# Patient Record
Sex: FEMALE | Race: WHITE | Employment: UNEMPLOYED | URBAN - METROPOLITAN AREA
[De-identification: names, ages, dates, MRNs, and addresses within clinical notes are randomized per-mention and may not be internally consistent; named-entity substitution may affect disease eponyms.]

---

## 2017-04-03 NOTE — TELEPHONE ENCOUNTER
Was the patient seen in the last year in this department? Yes  Pt last seen 05/25/2016    Does patient have an active prescription for medications requested? No     Received Request Via: Pharmacy

## 2017-04-04 RX ORDER — LEVONORGESTREL AND ETHINYL ESTRADIOL 0.1-0.02MG
KIT ORAL
Qty: 28 TAB | Refills: 2 | Status: SHIPPED | OUTPATIENT
Start: 2017-04-04

## 2017-04-07 NOTE — TELEPHONE ENCOUNTER
Spoke withient and she will keep her appt. On 04/12/2017 for an annual and to discuss BC. Will schedule her at that time for her pap in November

## 2017-04-12 ENCOUNTER — OFFICE VISIT (OUTPATIENT)
Dept: MEDICAL GROUP | Facility: MEDICAL CENTER | Age: 27
End: 2017-04-12
Payer: COMMERCIAL

## 2017-04-12 VITALS
HEIGHT: 63 IN | TEMPERATURE: 98.8 F | WEIGHT: 96.2 LBS | BODY MASS INDEX: 17.05 KG/M2 | OXYGEN SATURATION: 100 % | DIASTOLIC BLOOD PRESSURE: 62 MMHG | SYSTOLIC BLOOD PRESSURE: 92 MMHG | HEART RATE: 55 BPM

## 2017-04-12 DIAGNOSIS — Z13.21 ENCOUNTER FOR VITAMIN DEFICIENCY SCREENING: ICD-10-CM

## 2017-04-12 DIAGNOSIS — Z13.228 SCREENING FOR METABOLIC DISORDER: ICD-10-CM

## 2017-04-12 DIAGNOSIS — Z13.0 SCREENING FOR DEFICIENCY ANEMIA: ICD-10-CM

## 2017-04-12 DIAGNOSIS — Z13.220 SCREENING FOR HYPERLIPIDEMIA: ICD-10-CM

## 2017-04-12 DIAGNOSIS — Z30.09 FAMILY PLANNING: ICD-10-CM

## 2017-04-12 PROCEDURE — 99395 PREV VISIT EST AGE 18-39: CPT | Performed by: NURSE PRACTITIONER

## 2017-04-12 ASSESSMENT — PATIENT HEALTH QUESTIONNAIRE - PHQ9: CLINICAL INTERPRETATION OF PHQ2 SCORE: 0

## 2017-04-12 NOTE — PROGRESS NOTES
Chief Complaint:     Jahaira MONTGOMERY is a 26 y.o. female who presents for annual exam and   Family planning  Planning to get pregnant in the fall  Taking ocps now      Pt has GYN provider: no  Last colonoscopy: n/a  Bone density test:N\A  Gardiasil:declined   Last Td: 8/2014  Regular exercise: yes     She  has a past medical history of Raynaud's syndrome.  She  has no past surgical history on file.  Current Outpatient Prescriptions   Medication Sig Dispense Refill   • SRONYX 0.1-20 MG-MCG per tablet TAKE 1 TAB BY MOUTH EVERY DAY. 28 Tab 2   • fluticasone (FLONASE) 50 MCG/ACT nasal spray Spray 2 sprays in each nostril daily PRN (Patient not taking: Reported on 5/25/2016) 1 Bottle 0     No current facility-administered medications for this visit.     Social History   Substance Use Topics   • Smoking status: Never Smoker    • Smokeless tobacco: Never Used   • Alcohol Use: 0.0 oz/week     0 Standard drinks or equivalent per week       Review Of Systems  Denies fever, chills, or sweats  Skin: negative for rash, scaling, itching, pigmentation, hair or nail changes.  Eyes: negative for visual blurring, double vision, eye pain, floaters and discharge from eyes  Ears/Nose/Throat: negative for tinnitus, vertigo, bleeding gums, dental problem and hoarseness, frequent URI's, sinus trouble, persistent sore throat  Respiratory: negative for persistent cough, hemoptysis, dyspnea, recurrent pneumonia or bronchitis, asthma and wheezing  Cardiovascular: negative for palpitations, tachycardia, irregular heart beat, chest pain, or peripheral edema.  Breast: Denies breast tenderness, mass, discharge, changes in size or contour, or abnormal cyclic discomfort.  Gastrointestinal: negative for poor appetite, dysphagia, nausea, heartburn or reflux, abdominal pain, hemorrhoids, constipation or diarrhea  Genitourinary: negative for nocturia, dysuria, frequency, hesitancy, incontinence, sexually transmitted disease, abnormal vaginal discharge,  "dysparunia or abnormal vaginal bleeding  Musculoskeletal: negative for joint swelling and muscle pain/ soreness  Neurologic: negative for migraine headaches, involuntary movements or tremor  Psychiatric: negative for excessive alcohol consumption or illegal drug usage, sleep disturbance, anxiety, depression, sexual difficulties  Hematologic/Lymphatic/Immunologic: negative for anemia, unusual bruising, swollen glands, HIV risk factors  Endocrine: negative for temperature intolerance, polydipsia, polyuria.       PHYSICAL EXAMINATION:  Blood pressure 92/62, pulse 55, temperature 37.1 °C (98.8 °F), height 1.6 m (5' 2.99\"), weight 43.636 kg (96 lb 3.2 oz), last menstrual period 04/04/2017, SpO2 100 %, not currently breastfeeding.  Body mass index is 17.05 kg/(m^2).  Wt Readings from Last 4 Encounters:   04/12/17 43.636 kg (96 lb 3.2 oz)   05/25/16 44.271 kg (97 lb 9.6 oz)   03/30/16 44.18 kg (97 lb 6.4 oz)   07/16/15 45.36 kg (100 lb)       Constitutional: Alert, no distress.  Skin: Warm, dry, good turgor, no rashes in visible areas.  Eye: Equal, round and reactive, conjunctiva clear, lids normal.  ENMT: Lips without lesions, good dentition, oropharynx clear.  Neck: Trachea midline, no masses, no thyromegaly.  Respiratory: Unlabored respiratory effort, lungs clear to auscultation, no wheezes, no ronchi.  Cardiovascular: Normal S1, S2, no murmur, no edema.  Abdomen: Soft, non-tender, no masses, no hepatosplenomegaly.  Psych: Alert and oriented x3, normal affect and mood.    ASSESSMENT/PLAN:  1. Family planning     2. Screening for deficiency anemia  CBC WITHOUT DIFFERENTIAL   3. Screening for metabolic disorder  COMP METABOLIC PANEL    LIPID PROFILE    TSH   4. Screening for hyperlipidemia  LIPID PROFILE   5. Encounter for vitamin deficiency screening  VITAMIN D,25 HYDROXY        Labs per orders  Immunizations per orders  Pt counseled about skin care, diet, supplements, and exercise.  Next office visit for recheck of " chronic medical conditions is due in  1 year

## 2017-04-12 NOTE — MR AVS SNAPSHOT
"Jahaira MONTGOMERY   2017 10:00 AM   Office Visit   MRN: [id number]    Department:  [address] [place] [address]   Dept Phone:  [phone]    Description:  Female : 1990   Provider:  BELKYS Cade           Reason for Visit     Annual Exam     Contraception discuss family planning      Allergies as of 2017     Allergen Noted Reactions    Psudatabs 2015   Unspecified    Poor circulation in hands      You were diagnosed with     Screening for deficiency anemia   [022440]       Screening for metabolic disorder   [9459880]       Screening for hyperlipidemia   [679018]       Encounter for vitamin deficiency screening   [[id number]]         Vital Signs     Blood Pressure Pulse Temperature Height Weight Body Mass Index    92/62 mmHg 55 37.1 °C (98.8 °F) 1.6 m (5' 2.99\") 43.636 kg (96 lb 3.2 oz) 17.05 kg/m2    Oxygen Saturation Last Menstrual Period Breastfeeding? Smoking Status          100% 2017 No Never Smoker         Basic Information     Date Of Birth Sex Race Ethnicity Preferred Language    1990 Female White Unknown English      Problem List              ICD-10-CM Priority Class Noted - Resolved    Family planning Z30.09   2015 - Present      Health Maintenance        Date Due Completion Dates    IMM HEP A VACCINE (1 of 2 - Standard Series) 1990 ---    IMM VARICELLA (CHICKENPOX) VACCINE (1 of 2 - 2 Dose Adolescent Series) 2002 ---    IMM HPV VACCINE (2 of 3 - Female 3 Dose Series) 9/10/2015 2015 (Declined)    Override on 2015: Patient Declined    PAP SMEAR 11/3/2017 11/3/2014    IMM DTaP/Tdap/Td Vaccine (7 - Td) 2024, 10/4/1993, 1992, 1990, 1990, 1990            Current Immunizations     Dtap Vaccine 10/4/1993, 1992, 1990, 1989, 1989    Hepatitis B Vaccine Non-Recombivax (Ped/Adol) 2015, 2004, 2004    Hib Vaccine (Prp-d) Historical Data 1990    Influenza Vaccine Quad Inj (Pf) 10/5/2015 11:16 " AM    MMR Vaccine 10/4/1993, 11/9/1990    OPV - Historical Data 10/4/1993, 11/28/1992, 1989, 1989    Tdap Vaccine 8/6/2014    Tuberculin Skin Test 8/28/2013  9:30 AM, 8/21/2013  9:40 AM      Below and/or attached are the medications your provider expects you to take. Review all of your home medications and newly ordered medications with your provider and/or pharmacist. Follow medication instructions as directed by your provider and/or pharmacist. Please keep your medication list with you and share with your provider. Update the information when medications are discontinued, doses are changed, or new medications (including over-the-counter products) are added; and carry medication information at all times in the event of emergency situations     Allergies:  PSUDATABS - Unspecified               Medications  Valid as of: April 12, 2017 - 10:29 AM    Generic Name Brand Name Tablet Size Instructions for use    Fluticasone Propionate (Suspension) FLONASE 50 MCG/ACT Spray 2 sprays in each nostril daily PRN        Levonorgestrel-Ethinyl Estrad (Tab) SRONYX 0.1-20 MG-MCG TAKE 1 TAB BY MOUTH EVERY DAY.        .                 Medicines prescribed today were sent to:     [facility]/PH[facility]ACY #[id number] [facility] [address] [id number] [facility] [address]    Phone: [phone] Fax: [phone]    Open 24 Hours?: No      Medication refill instructions:       If your prescription bottle indicates you have medication refills left, it is not necessary to call your provider’s office. Please contact your pharmacy and they will refill your medication.    If your prescription bottle indicates you do not have any refills left, you may request refills at any time through one of the following ways: The online [employer] system (except Urgent Care), by calling your provider’s office, or by asking your pharmacy to contact your provider’s office with a refill request. Medication refills are processed only during regular business  hours and may not be available until the next business day. Your provider may request additional information or to have a follow-up visit with you prior to refilling your medication.   *Please Note: Medication refills are assigned a new Rx number when refilled electronically. Your pharmacy may indicate that no refills were authorized even though a new prescription for the same medication is available at the pharmacy. Please request the medicine by name with the pharmacy before contacting your provider for a refill.        Your To Do List     Future Labs/Procedures Complete By Expires    CBC WITHOUT DIFFERENTIAL  As directed 10/13/2017    COMP METABOLIC PANEL  As directed 4/13/2018    LIPID PROFILE  As directed 4/13/2018    TSH  As directed 4/13/2018    VITAMIN D,25 HYDROXY  As directed 4/13/2018         MyChart Access Code: Activation code not generated  Current [employer] Status: Active

## 2017-05-03 RX ORDER — LEVONORGESTREL AND ETHINYL ESTRADIOL 0.1-0.02MG
1 KIT ORAL
Qty: 28 TAB | Refills: 2 | OUTPATIENT
Start: 2017-05-03

## 2017-05-16 RX ORDER — LEVONORGESTREL AND ETHINYL ESTRADIOL 0.1-0.02MG
KIT ORAL
Qty: 28 TAB | Refills: 0 | Status: SHIPPED | OUTPATIENT
Start: 2017-05-16 | End: 2017-07-19 | Stop reason: SDUPTHER

## 2017-06-19 ENCOUNTER — HOSPITAL ENCOUNTER (OUTPATIENT)
Dept: LAB | Facility: MEDICAL CENTER | Age: 27
End: 2017-06-19
Attending: NURSE PRACTITIONER
Payer: COMMERCIAL

## 2017-06-19 DIAGNOSIS — Z13.228 SCREENING FOR METABOLIC DISORDER: ICD-10-CM

## 2017-06-19 DIAGNOSIS — Z13.220 SCREENING FOR HYPERLIPIDEMIA: ICD-10-CM

## 2017-06-19 DIAGNOSIS — Z13.21 ENCOUNTER FOR VITAMIN DEFICIENCY SCREENING: ICD-10-CM

## 2017-06-19 DIAGNOSIS — Z13.0 SCREENING FOR DEFICIENCY ANEMIA: ICD-10-CM

## 2017-06-19 LAB
25(OH)D3 SERPL-MCNC: 20 NG/ML (ref 30–100)
ALBUMIN SERPL BCP-MCNC: 4.3 G/DL (ref 3.2–4.9)
ALBUMIN/GLOB SERPL: 1.7 G/DL
ALP SERPL-CCNC: 55 U/L (ref 30–99)
ALT SERPL-CCNC: 9 U/L (ref 2–50)
ANION GAP SERPL CALC-SCNC: 5 MMOL/L (ref 0–11.9)
AST SERPL-CCNC: 15 U/L (ref 12–45)
BILIRUB SERPL-MCNC: 0.5 MG/DL (ref 0.1–1.5)
BUN SERPL-MCNC: 13 MG/DL (ref 8–22)
CALCIUM SERPL-MCNC: 9.3 MG/DL (ref 8.5–10.5)
CHLORIDE SERPL-SCNC: 110 MMOL/L (ref 96–112)
CHOLEST SERPL-MCNC: 185 MG/DL (ref 100–199)
CO2 SERPL-SCNC: 24 MMOL/L (ref 20–33)
CREAT SERPL-MCNC: 0.91 MG/DL (ref 0.5–1.4)
ERYTHROCYTE [DISTWIDTH] IN BLOOD BY AUTOMATED COUNT: 44.6 FL (ref 35.9–50)
GFR SERPL CREATININE-BSD FRML MDRD: >60 ML/MIN/1.73 M 2
GLOBULIN SER CALC-MCNC: 2.5 G/DL (ref 1.9–3.5)
GLUCOSE SERPL-MCNC: 74 MG/DL (ref 65–99)
HCT VFR BLD AUTO: 45.9 % (ref 37–47)
HDLC SERPL-MCNC: 53 MG/DL
HGB BLD-MCNC: 14.8 G/DL (ref 12–16)
LDLC SERPL CALC-MCNC: 115 MG/DL
MCH RBC QN AUTO: 30.3 PG (ref 27–33)
MCHC RBC AUTO-ENTMCNC: 32.2 G/DL (ref 33.6–35)
MCV RBC AUTO: 94.1 FL (ref 81.4–97.8)
PLATELET # BLD AUTO: 188 K/UL (ref 164–446)
PMV BLD AUTO: 10.3 FL (ref 9–12.9)
POTASSIUM SERPL-SCNC: 4.5 MMOL/L (ref 3.6–5.5)
PROT SERPL-MCNC: 6.8 G/DL (ref 6–8.2)
RBC # BLD AUTO: 4.88 M/UL (ref 4.2–5.4)
SODIUM SERPL-SCNC: 139 MMOL/L (ref 135–145)
TRIGL SERPL-MCNC: 84 MG/DL (ref 0–149)
TSH SERPL DL<=0.005 MIU/L-ACNC: 1.65 UIU/ML (ref 0.3–3.7)
WBC # BLD AUTO: 5.8 K/UL (ref 4.8–10.8)

## 2017-06-19 PROCEDURE — 85027 COMPLETE CBC AUTOMATED: CPT

## 2017-06-19 PROCEDURE — 36415 COLL VENOUS BLD VENIPUNCTURE: CPT

## 2017-06-19 PROCEDURE — 84443 ASSAY THYROID STIM HORMONE: CPT

## 2017-06-19 PROCEDURE — 82306 VITAMIN D 25 HYDROXY: CPT

## 2017-06-19 PROCEDURE — 80053 COMPREHEN METABOLIC PANEL: CPT

## 2017-06-19 PROCEDURE — 80061 LIPID PANEL: CPT

## 2017-07-19 RX ORDER — LEVONORGESTREL AND ETHINYL ESTRADIOL 0.1-0.02MG
1 KIT ORAL DAILY
Qty: 28 TAB | Refills: 6 | Status: SHIPPED | OUTPATIENT
Start: 2017-07-19

## 2018-11-06 ENCOUNTER — OFFICE VISIT (OUTPATIENT)
Dept: MEDICAL GROUP | Facility: MEDICAL CENTER | Age: 28
End: 2018-11-06
Payer: COMMERCIAL

## 2018-11-06 ENCOUNTER — HOSPITAL ENCOUNTER (OUTPATIENT)
Facility: MEDICAL CENTER | Age: 28
End: 2018-11-06
Attending: INTERNAL MEDICINE
Payer: COMMERCIAL

## 2018-11-06 VITALS
SYSTOLIC BLOOD PRESSURE: 98 MMHG | BODY MASS INDEX: 17.34 KG/M2 | DIASTOLIC BLOOD PRESSURE: 62 MMHG | OXYGEN SATURATION: 100 % | HEART RATE: 64 BPM | TEMPERATURE: 98.8 F | HEIGHT: 63 IN | WEIGHT: 97.88 LBS

## 2018-11-06 DIAGNOSIS — Z00.00 HEALTH CARE MAINTENANCE: ICD-10-CM

## 2018-11-06 DIAGNOSIS — N97.9 INFERTILITY, FEMALE: ICD-10-CM

## 2018-11-06 DIAGNOSIS — Z30.09 FAMILY PLANNING: ICD-10-CM

## 2018-11-06 DIAGNOSIS — E55.9 HYPOVITAMINOSIS D: ICD-10-CM

## 2018-11-06 DIAGNOSIS — Z12.4 SCREENING FOR MALIGNANT NEOPLASM OF CERVIX: ICD-10-CM

## 2018-11-06 DIAGNOSIS — Z01.419 WELL FEMALE EXAM WITH ROUTINE GYNECOLOGICAL EXAM: ICD-10-CM

## 2018-11-06 DIAGNOSIS — E46 MALNUTRITION, UNSPECIFIED TYPE (HCC): ICD-10-CM

## 2018-11-06 DIAGNOSIS — Z76.89 ENCOUNTER TO ESTABLISH CARE: ICD-10-CM

## 2018-11-06 PROCEDURE — 99385 PREV VISIT NEW AGE 18-39: CPT | Performed by: INTERNAL MEDICINE

## 2018-11-06 PROCEDURE — 88175 CYTOPATH C/V AUTO FLUID REDO: CPT

## 2018-11-06 ASSESSMENT — PATIENT HEALTH QUESTIONNAIRE - PHQ9: CLINICAL INTERPRETATION OF PHQ2 SCORE: 0

## 2018-11-06 NOTE — PROGRESS NOTES
CHIEF COMPLIANT:  Jahaira Chase is a 28 y.o. female who presents for annual exam    Pt has GYN provider: no    Recommendations:  Regular exercise at least 4 days a week  Diet: advised balanced diet  Dental exam at least 1-2 times per year  Sunscreen use: advised    Immunizations:  TdaP:  8/2014    GYN  Last PAP:   11/2014, normal   Abnormal PAP: no    Menarche:      Menses every 28 days with bleeding for 3-5 days  No excess cramping or bleeding.   Takes OTC analgesics for cramping  Contraception: none x 1 y  Hx STD''s: no    Family planning/infertilit, malnutrition BMI 17  The patient has been w/o OCP for 1 year.   She came with , and they are concerned that they might have fertility problems.   Periods are regular, every 28 days, lasting up to 5 days.   No stress.   TSH was normal in past.   She is doing moderate to strenuous exercise almost daily, with BMI of 17    Hypovitaminosis D  The patient had low vitamin D level.   No Vitamin d supplement.     CURRENT MEDICATIONS  Current Outpatient Prescriptions   Medication Sig Dispense Refill   • levonorgestrel-ethinyl estradiol (SRONYX) 0.1-20 MG-MCG per tablet Take 1 Tab by mouth every day. 28 Tab 6   • SRONYX 0.1-20 MG-MCG per tablet TAKE 1 TAB BY MOUTH EVERY DAY. 28 Tab 2   • fluticasone (FLONASE) 50 MCG/ACT nasal spray Spray 2 sprays in each nostril daily PRN (Patient not taking: Reported on 5/25/2016) 1 Bottle 0     No current facility-administered medications for this visit.      ALLERGIES  Allergies: Psudatabs  PAST MEDICAL HISTORY  She  has a past medical history of Raynaud's syndrome.  SURGICAL HISTORY  She  has no past surgical history on file.  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     FAMILY HISTORY  Family History   Problem Relation Age of  Onset   • Hyperlipidemia Mother    • Alcohol/Drug Father    • Psychiatry Father    • Lung Disease Brother    • Stroke Maternal Aunt    • Alcohol/Drug Maternal Uncle    • Seizures Paternal Aunt    • Psychiatry Brother    • Lung Disease Brother    • Alcohol/Drug Maternal Aunt      Family Status   Relation Status   • Mo Alive   • Fa Alive   • Sis Alive   • Bro Alive   • MAunt Alive   • MUnc Alive   • PAunt Alive   • Bro Alive   • Bro Alive   • MAunt Alive     REVIEW OF SYSTEMS  Constitutional: Denies fever, chills, or sweats  Skin: negative for rash, scaling, itching, pigmentation, hair or nail changes.  Eyes: negative for visual blurring, double vision, eye pain, floaters and discharge from eyes  ENT: negative for tinnitus, vertigo, bleeding gums, dental problem and hoarseness, frequent URI's, sinus trouble, persistent sore throat  Respiratory: negative for persistent cough, hemoptysis, dyspnea, recurrent pneumonia or bronchitis, asthma and wheezing  Cardiovascular: negative for palpitations, tachycardia, irregular heart beat, chest pain, or peripheral edema.  Gastrointestinal: negative for poor appetite, dysphagia, nausea, heartburn or reflux, abdominal pain, hemorrhoids, constipation or diarrhea  Genitourinary: negative for dysuria, frequency, hesitancy, incontinence, sexually transmitted disease, abnormal vaginal discharge/bleeding, dysparunia   Musculoskeletal: negative for joint swelling and muscle pain/ soreness  Neuro: negative for migraine headaches, involuntary movements or tremor  Psychiatric: negative for excessive alcohol consumption or illegal drug use, sleep problems, anxiety, depression, sexual difficulties  Hematologic/Lymphatic/Immunologic: negative for anemia, unusual bruising, swollen glands, HIV risk factors  Endocrine: negative for temperature intolerance, polydipsia, polyuria.     PHYSICAL EXAMINATION:  Blood pressure (!) 98/62, pulse 64, temperature 37.1 °C (98.8 °F), temperature source  "Temporal, height 1.6 m (5' 3\"), weight 44.4 kg (97 lb 14.2 oz), last menstrual period 10/21/2018, SpO2 100 %, not currently breastfeeding.  Body mass index is 17.34 kg/m².  Wt Readings from Last 4 Encounters:   11/06/18 44.4 kg (97 lb 14.2 oz)   04/12/17 43.6 kg (96 lb 3.2 oz)   05/25/16 44.3 kg (97 lb 9.6 oz)   03/30/16 44.2 kg (97 lb 6.4 oz)     General appearance:healthy, well developed, well nourished, well-groomed  Psych: alert, no distress, cooperative. Eye contact good, speech goal directed. Affect calm.   Eyes: conjunctivae and sclerae normal, lids and lashes normal, EOMI, PERRLA  ENT: Ears: external ears normal to inspection, canals clear. TMs pearly gray with normal light reflex and anatomic landmarks, Nose/Sinuses: nose shows no deformity, asymmetry, or inflammation, nasal mucosa normal, no sinus tenderness,   Oropharynx: lips normal without lesions, buccal mucosa normal, gums healthy, teeth intact, non-carious, palate normal, tongue midline and normal  Neck: no asymmetry, masses, adenopathy. Thyroid normal to palpation, carotids without bruits, no jugular venous distention  Lungs: clear to auscultation with good excursion, Normal respiratory rate. Chest symmetric no chest wall tenderness.  Cardiovascular: Regular rate and rhythm, no murmur.  No peripheral edema  Abdomen:  Soft, non-tender, no masses, HSM or palpable renal abnormalities. Bowel sounds normal, no bruits heard. No CVAT.  Musculoskeletal: no evidence of joint effusion, ROM of all joints is normal, no crepitation detected, strength grossly normal UE and LE, proximal and distal motor groups. No deformities present  Lymphatic: None significantly enlarged supraclavicular, axillary, or inguinal  Skin: color normal, vascularity normal, no rashes or suspicious lesions, no evidence of bleeding or bruising  Neuro: speech normal, mental status intact, gait grossly normal, muscle tone normal.  GYN: No suprapubic tenderness.  Perineum and external " genitalia normal without rash.   Vagina with without discharge, normal mucosa.  Cervix w/o visible lesions or discharge. No CMT  Uterus normal size, non-tender, no masses,   Adnexa w/o mass or tenderness.   PAP smear was obtained.    LABS    Labs are reviewed and discussed with a patient  Lab Results   Component Value Date/Time    CHOLSTRLTOT 185 06/19/2017 08:59 AM     (H) 06/19/2017 08:59 AM    HDL 53 06/19/2017 08:59 AM    TRIGLYCERIDE 84 06/19/2017 08:59 AM       Lab Results   Component Value Date/Time    SODIUM 139 06/19/2017 08:59 AM    POTASSIUM 4.5 06/19/2017 08:59 AM    CHLORIDE 110 06/19/2017 08:59 AM    CO2 24 06/19/2017 08:59 AM    GLUCOSE 74 06/19/2017 08:59 AM    BUN 13 06/19/2017 08:59 AM    CREATININE 0.91 06/19/2017 08:59 AM     Lab Results   Component Value Date/Time    ALKPHOSPHAT 55 06/19/2017 08:59 AM    ASTSGOT 15 06/19/2017 08:59 AM    ALTSGPT 9 06/19/2017 08:59 AM    TBILIRUBIN 0.5 06/19/2017 08:59 AM     Lab Results   Component Value Date/Time    WBC 5.8 06/19/2017 08:59 AM    RBC 4.88 06/19/2017 08:59 AM    HEMOGLOBIN 14.8 06/19/2017 08:59 AM    HEMATOCRIT 45.9 06/19/2017 08:59 AM    MCV 94.1 06/19/2017 08:59 AM    MCH 30.3 06/19/2017 08:59 AM    MCHC 32.2 (L) 06/19/2017 08:59 AM    MPV 10.3 06/19/2017 08:59 AM     ASSESSMENT/PLAN:    1. Well female exam with routine gynecological exam  - THINPREP PAP,REFLEX HPV ON ASC-US ONLY; Future  2. Screening for malignant neoplasm of cervix  - THINPREP PAP,REFLEX HPV ON ASC-US ONLY; Future    3. Health care maintenance  UTD    4. Family planning  5. Infertility, female  Advised patient about checking ovulation, practices to improve outcomes.  - TSH; Future  - PROLACTIN; Future  - FSH/LH; Future  - US-PELVIC TRANSVAGINAL ONLY; Future    -Advised to check with insurance about the co-pay.    6. Malnutrition, unspecified type (HCC)  Advised to have 3 meals and 2 snacks, low calorie count per day for more than 1800 atif daily    7. Hypovitaminosis  D  Advised to start OTC vitamin D supplement, at least 1000 units daily  - VITAMIN D,25 HYDROXY; Future    8. Encounter to establish care  Reviewed PMH, PSH, FH, SH, ALL, MEDS, HCM/IMM.     Smoking Counseling: Nonsmoker    Next office visit is due in  1 year    All questions are answered.     Please note that this dictation was created using voice recognition software. Despite all efforts, some minor grammar mistakes are possible.

## 2018-11-07 DIAGNOSIS — Z12.4 SCREENING FOR MALIGNANT NEOPLASM OF CERVIX: ICD-10-CM

## 2018-11-07 DIAGNOSIS — Z01.419 WELL FEMALE EXAM WITH ROUTINE GYNECOLOGICAL EXAM: ICD-10-CM

## 2018-11-07 LAB — CYTOLOGY REG CYTOL: NORMAL

## 2019-07-26 ENCOUNTER — HOSPITAL ENCOUNTER (OUTPATIENT)
Dept: LAB | Facility: MEDICAL CENTER | Age: 29
End: 2019-07-26
Attending: INTERNAL MEDICINE
Payer: COMMERCIAL

## 2019-07-26 DIAGNOSIS — E55.9 HYPOVITAMINOSIS D: ICD-10-CM

## 2019-07-26 DIAGNOSIS — N97.9 INFERTILITY, FEMALE: ICD-10-CM

## 2019-07-26 LAB
25(OH)D3 SERPL-MCNC: 26 NG/ML (ref 30–100)
FSH SERPL-ACNC: 7.7 MIU/ML
LH SERPL-ACNC: 5 IU/L
PROLACTIN SERPL-MCNC: 9.21 NG/ML (ref 2.8–26)
TSH SERPL DL<=0.005 MIU/L-ACNC: 1.35 UIU/ML (ref 0.38–5.33)

## 2019-07-26 PROCEDURE — 36415 COLL VENOUS BLD VENIPUNCTURE: CPT

## 2019-07-26 PROCEDURE — 84146 ASSAY OF PROLACTIN: CPT

## 2019-07-26 PROCEDURE — 84443 ASSAY THYROID STIM HORMONE: CPT

## 2019-07-26 PROCEDURE — 83002 ASSAY OF GONADOTROPIN (LH): CPT

## 2019-07-26 PROCEDURE — 82306 VITAMIN D 25 HYDROXY: CPT

## 2019-07-26 PROCEDURE — 83001 ASSAY OF GONADOTROPIN (FSH): CPT

## 2019-11-13 ENCOUNTER — GYNECOLOGY VISIT (OUTPATIENT)
Dept: OBGYN | Facility: MEDICAL CENTER | Age: 29
End: 2019-11-13
Payer: COMMERCIAL

## 2019-11-13 VITALS — WEIGHT: 98 LBS | DIASTOLIC BLOOD PRESSURE: 60 MMHG | SYSTOLIC BLOOD PRESSURE: 90 MMHG | BODY MASS INDEX: 17.36 KG/M2

## 2019-11-13 DIAGNOSIS — N97.9 INFERTILITY, FEMALE, PRIMARY: ICD-10-CM

## 2019-11-13 PROCEDURE — 99203 OFFICE O/P NEW LOW 30 MIN: CPT | Performed by: OBSTETRICS & GYNECOLOGY

## 2019-11-13 NOTE — NON-PROVIDER
Pt is here for a new pt visit   She would like to discuss family planning.  She has been trying to conceive for 2 years.  LMP 10/31/19  [phone]

## 2019-11-14 ENCOUNTER — HOSPITAL ENCOUNTER (OUTPATIENT)
Facility: MEDICAL CENTER | Age: 29
End: 2019-11-14
Attending: OBSTETRICS & GYNECOLOGY
Payer: COMMERCIAL

## 2019-11-14 DIAGNOSIS — N97.9 INFERTILITY, FEMALE, PRIMARY: ICD-10-CM

## 2019-11-14 LAB
C TRACH DNA SPEC QL NAA+PROBE: NEGATIVE
N GONORRHOEA DNA SPEC QL NAA+PROBE: NEGATIVE
SPECIMEN SOURCE: NORMAL

## 2019-11-14 PROCEDURE — 87491 CHLMYD TRACH DNA AMP PROBE: CPT

## 2019-11-14 PROCEDURE — 87591 N.GONORRHOEAE DNA AMP PROB: CPT

## 2019-11-14 NOTE — PROGRESS NOTES
Subjective:      Jahaira Chase is a 29 y.o. female who presents with New Patient        Cc: infertility    HPI: 29-year-old  0, last menstrual period 10/31/2019, presents for evaluation of infertility.  She has been trying to conceive for the past 2 years.  Menses are every 26 to 28 days, lasting 4 to 5 days, with flow described as normal.  She denies intermenstrual or postcoital bleeding.  She denies pain with her periods.  She has sex approximately every other day.  She was previously on oral contraceptives prior to starting to try to conceive.  Her last dose of OCPs was 2 years ago.  She denies symptoms of hirsutism, or acne.  Denies galactorrhea.  Denies history of abnormal Pap smears or STDs.  Her  has had a semen analysis in 2019, which was within normal limits.  She states she is also been using ovulation predictor kits and has a surgeon on days 12-15.    Past Medical History:   Diagnosis Date   • Raynaud's syndrome        History reviewed. No pertinent surgical history.      Current Outpatient Medications:   •  levonorgestrel-ethinyl estradiol (SRONYX) 0.1-20 MG-MCG per tablet, Take 1 Tab by mouth every day., Disp: 28 Tab, Rfl: 6  •  SRONYX 0.1-20 MG-MCG per tablet, TAKE 1 TAB BY MOUTH EVERY DAY., Disp: 28 Tab, Rfl: 2  •  fluticasone (FLONASE) 50 MCG/ACT nasal spray, Spray 2 sprays in each nostril daily PRN (Patient not taking: Reported on 2016), Disp: 1 Bottle, Rfl: 0    Psudatabs    Family History   Problem Relation Age of Onset   • Hyperlipidemia Mother    • Alcohol/Drug Father    • Psychiatric Illness Father    • Lung Disease Brother    • Stroke Maternal Aunt    • Alcohol/Drug Maternal Uncle    • Seizures Paternal Aunt    • Psychiatric Illness Brother    • Lung Disease Brother    • Alcohol/Drug Maternal Aunt        Social History     Socioeconomic History   • Marital status:      Spouse name: Not on file   • Number of children: Not on file   • Years of education: Not on  file   • Highest education level: Not on file   Occupational History   • Not on file   Social Needs   • Financial resource strain: Not on file   • Food insecurity:     Worry: Not on file     Inability: Not on file   • Transportation needs:     Medical: Not on file     Non-medical: Not on file   Tobacco Use   • Smoking status: Never Smoker   • Smokeless tobacco: Never Used   Substance and Sexual Activity   • Alcohol use: Yes     Alcohol/week: 0.0 oz   • Drug use: No   • Sexual activity: Yes     Partners: Male   Lifestyle   • Physical activity:     Days per week: Not on file     Minutes per session: Not on file   • Stress: Not on file   Relationships   • Social connections:     Talks on phone: Not on file     Gets together: Not on file     Attends Cheondoism service: Not on file     Active member of club or organization: Not on file     Attends meetings of clubs or organizations: Not on file     Relationship status: Not on file   • Intimate partner violence:     Fear of current or ex partner: Not on file     Emotionally abused: Not on file     Physically abused: Not on file     Forced sexual activity: Not on file   Other Topics Concern   • Not on file   Social History Narrative     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    All other systems reviewed and are negative or noncontributory.       Objective:     BP (!) 90/60   Wt 44.5 kg (98 lb)   LMP 10/31/2019   BMI 17.36 kg/m²      Physical Exam      GENERAL: Alert, in no apparent distress  PSYCHIATRIC: Appropriate affect, intact insight and judgement.  NECK:  Nontender, no masses.  No Thyromegaly or nodules. No lymphadenopathy.  RESPIRATORY: Normal respiratory effort.  Lungs clear to auscultation.   CARDIOVASCULAR: RRR, no murmur, gallop, or  rub.  ABDOMEN: Soft, nontender, nondistended.  No palpable masses.  No rebound or guarding.  No inguinal lymphadenopathy.  No hepatosplenomegaly.  No hernias.  BACK: No CVA tenderness  EXTREMITIES: No edema  SKIN: No rash    BREAST: No masses or tenderness.  No skin changes.  No nipple inversion or discharge. No axillary lymphadenopathy.      GENITOURINARY:  Normal external genitalia, no lesions.  Normal urethral meatus, no masses or tenderness.  Normal bladder without fullness or masses.  Vagina well estrogenized, no vaginal discharge or lesions.  Cervix without lesions or discharge, nontender.  Uterus normal size, shape, and contour, nontender.  Adnexa nontender, no masses.  Normal anus and perineum.    Rectal Exam - not indicated.       Assessment/Plan:       1. Infertility, female, primary  Day 3 labs ordered.  Day 21 progesterone ordered.  TSH and previous Pap smear within normal limits.  Semen analysis is within normal limits.  Will obtain pelvic ultrasound to assess anatomy.  If normal, will plan to proceed with HSG.  We discussed that if the work-up is all negative, diagnosis of unexplained infertility is treated with ovulation induction and IUI versus IVF, and a referral will be placed to infertility for this treatment.  Folic acid recommended.  - US-PELVIC COMPLETE (TRANSABDOMINAL/TRANSVAGINAL) (COMBO); Future  - FSH; Future  - LUTEINIZING HORMONE SERUM (LH); Future  - ESTRADIOL; Future  - PROGESTERONE; Future  - Chlamydia/GC PCR Urine Or Swab; Future    Will call with results and further management.

## 2020-01-27 ENCOUNTER — HOSPITAL ENCOUNTER (OUTPATIENT)
Dept: LAB | Facility: MEDICAL CENTER | Age: 30
End: 2020-01-27
Attending: OBSTETRICS & GYNECOLOGY
Payer: COMMERCIAL

## 2020-01-27 DIAGNOSIS — N97.9 INFERTILITY, FEMALE, PRIMARY: ICD-10-CM

## 2020-01-27 LAB
ESTRADIOL SERPL-MCNC: 35 PG/ML
FSH SERPL-ACNC: 13.2 MIU/ML
LH SERPL-ACNC: 8 IU/L

## 2020-01-27 PROCEDURE — 82670 ASSAY OF TOTAL ESTRADIOL: CPT

## 2020-01-27 PROCEDURE — 83002 ASSAY OF GONADOTROPIN (LH): CPT

## 2020-01-27 PROCEDURE — 36415 COLL VENOUS BLD VENIPUNCTURE: CPT

## 2020-01-27 PROCEDURE — 83001 ASSAY OF GONADOTROPIN (FSH): CPT

## 2020-03-05 ENCOUNTER — HOSPITAL ENCOUNTER (OUTPATIENT)
Dept: RADIOLOGY | Facility: MEDICAL CENTER | Age: 30
End: 2020-03-05
Attending: OBSTETRICS & GYNECOLOGY
Payer: COMMERCIAL

## 2020-03-05 DIAGNOSIS — N97.9 INFERTILITY, FEMALE, PRIMARY: ICD-10-CM

## 2020-03-05 PROCEDURE — 76830 TRANSVAGINAL US NON-OB: CPT

## 2020-03-10 DIAGNOSIS — N97.9 INFERTILITY, FEMALE: ICD-10-CM

## 2020-03-12 ENCOUNTER — HOSPITAL ENCOUNTER (OUTPATIENT)
Dept: LAB | Facility: MEDICAL CENTER | Age: 30
End: 2020-03-12
Attending: OBSTETRICS & GYNECOLOGY
Payer: COMMERCIAL

## 2020-03-12 DIAGNOSIS — N97.9 INFERTILITY, FEMALE, PRIMARY: ICD-10-CM

## 2020-03-12 LAB — PROGEST SERPL-MCNC: 9.23 NG/ML

## 2020-03-12 PROCEDURE — 84144 ASSAY OF PROGESTERONE: CPT

## 2020-03-12 PROCEDURE — 36415 COLL VENOUS BLD VENIPUNCTURE: CPT

## 2020-03-16 ENCOUNTER — TELEPHONE (OUTPATIENT)
Dept: OBGYN | Facility: CLINIC | Age: 30
End: 2020-03-16

## 2020-03-16 NOTE — TELEPHONE ENCOUNTER
----- Message from Poornima Juárez M.D. sent at 3/10/2020  2:49 PM PDT -----  Please call patient and inform her the US looks normal, except for a small fibroid, which should not cause infertility.  I have placed an order for HSG to make sure the tubes are open.  Please have her schedule it.    Unable to contact pt, msg left to call back.    Pt called back, instructed to set up an appt w/imaging. Verbalized understanding

## 2020-03-20 DIAGNOSIS — N97.9 INFERTILITY, FEMALE: ICD-10-CM

## 2020-06-17 ENCOUNTER — HOSPITAL ENCOUNTER (OUTPATIENT)
Dept: RADIOLOGY | Facility: MEDICAL CENTER | Age: 30
End: 2020-06-17
Attending: OBSTETRICS & GYNECOLOGY
Payer: COMMERCIAL

## 2020-06-17 ENCOUNTER — HOSPITAL ENCOUNTER (OUTPATIENT)
Dept: LAB | Facility: MEDICAL CENTER | Age: 30
End: 2020-06-17
Attending: OBSTETRICS & GYNECOLOGY
Payer: COMMERCIAL

## 2020-06-17 DIAGNOSIS — N97.9 INFERTILITY, FEMALE: ICD-10-CM

## 2020-06-17 LAB — B-HCG SERPL-ACNC: <1 MIU/ML (ref 0–5)

## 2020-06-17 PROCEDURE — 36415 COLL VENOUS BLD VENIPUNCTURE: CPT

## 2020-06-17 PROCEDURE — 700117 HCHG RX CONTRAST REV CODE 255: Performed by: OBSTETRICS & GYNECOLOGY

## 2020-06-17 PROCEDURE — 58340 CATHETER FOR HYSTEROGRAPHY: CPT

## 2020-06-17 PROCEDURE — 84702 CHORIONIC GONADOTROPIN TEST: CPT

## 2020-06-17 RX ADMIN — IOHEXOL 20 ML: 300 INJECTION, SOLUTION INTRAVENOUS at 13:00

## 2020-06-25 ENCOUNTER — TELEPHONE (OUTPATIENT)
Dept: OBGYN | Facility: CLINIC | Age: 30
End: 2020-06-25

## 2020-06-25 NOTE — TELEPHONE ENCOUNTER
----- Message from BELKYS Fish sent at 6/19/2020 10:43 PM PDT -----  Call pt and let her know her hysterpsalpinogram was WNL. If she wants to discuss further please get her an appt with Franck as she has been seeing this pt.      06/25/20 2:35 PM    Pt notified, pt stated she will be losing insurance soon and will call back to schedule a f/u with Dr. Juárez as soon as she know what insurance her  will have.

## 2021-12-01 ENCOUNTER — APPOINTMENT (RX ONLY)
Dept: URBAN - METROPOLITAN AREA CLINIC 4 | Facility: CLINIC | Age: 31
Setting detail: DERMATOLOGY
End: 2021-12-01

## 2021-12-01 DIAGNOSIS — Z71.89 OTHER SPECIFIED COUNSELING: ICD-10-CM

## 2021-12-01 DIAGNOSIS — L81.4 OTHER MELANIN HYPERPIGMENTATION: ICD-10-CM

## 2021-12-01 DIAGNOSIS — D18.0 HEMANGIOMA: ICD-10-CM

## 2021-12-01 DIAGNOSIS — L82.1 OTHER SEBORRHEIC KERATOSIS: ICD-10-CM

## 2021-12-01 DIAGNOSIS — D22 MELANOCYTIC NEVI: ICD-10-CM

## 2021-12-01 PROBLEM — D22.61 MELANOCYTIC NEVI OF RIGHT UPPER LIMB, INCLUDING SHOULDER: Status: ACTIVE | Noted: 2021-12-01

## 2021-12-01 PROBLEM — D22.5 MELANOCYTIC NEVI OF TRUNK: Status: ACTIVE | Noted: 2021-12-01

## 2021-12-01 PROBLEM — D22.71 MELANOCYTIC NEVI OF RIGHT LOWER LIMB, INCLUDING HIP: Status: ACTIVE | Noted: 2021-12-01

## 2021-12-01 PROBLEM — D22.62 MELANOCYTIC NEVI OF LEFT UPPER LIMB, INCLUDING SHOULDER: Status: ACTIVE | Noted: 2021-12-01

## 2021-12-01 PROBLEM — D22.72 MELANOCYTIC NEVI OF LEFT LOWER LIMB, INCLUDING HIP: Status: ACTIVE | Noted: 2021-12-01

## 2021-12-01 PROBLEM — D18.01 HEMANGIOMA OF SKIN AND SUBCUTANEOUS TISSUE: Status: ACTIVE | Noted: 2021-12-01

## 2021-12-01 PROCEDURE — 99203 OFFICE O/P NEW LOW 30 MIN: CPT

## 2021-12-01 PROCEDURE — ? COUNSELING

## 2021-12-01 PROCEDURE — ? PHOTO-DOCUMENTATION

## 2021-12-01 PROCEDURE — ? ADDITIONAL NOTES

## 2021-12-01 ASSESSMENT — LOCATION DETAILED DESCRIPTION DERM
LOCATION DETAILED: LEFT INFERIOR MEDIAL FOREHEAD
LOCATION DETAILED: LEFT SUPERIOR MEDIAL UPPER BACK
LOCATION DETAILED: LEFT ANTERIOR PROXIMAL THIGH
LOCATION DETAILED: SUPERIOR THORACIC SPINE
LOCATION DETAILED: LEFT MEDIAL UPPER BACK
LOCATION DETAILED: RIGHT ANTERIOR PROXIMAL UPPER ARM
LOCATION DETAILED: LEFT ANTERIOR DISTAL UPPER ARM
LOCATION DETAILED: INFERIOR THORACIC SPINE
LOCATION DETAILED: UPPER STERNUM
LOCATION DETAILED: EPIGASTRIC SKIN
LOCATION DETAILED: MIDDLE STERNUM
LOCATION DETAILED: RIGHT MEDIAL SUPERIOR CHEST
LOCATION DETAILED: RIGHT ANTERIOR DISTAL THIGH
LOCATION DETAILED: LEFT ANTERIOR PROXIMAL UPPER ARM
LOCATION DETAILED: RIGHT ANTERIOR DISTAL UPPER ARM
LOCATION DETAILED: RIGHT ANTERIOR PROXIMAL THIGH
LOCATION DETAILED: LOWER STERNUM
LOCATION DETAILED: LEFT LATERAL DORSAL FOOT

## 2021-12-01 ASSESSMENT — LOCATION SIMPLE DESCRIPTION DERM
LOCATION SIMPLE: LEFT FOREHEAD
LOCATION SIMPLE: ABDOMEN
LOCATION SIMPLE: LEFT FOOT
LOCATION SIMPLE: LEFT UPPER BACK
LOCATION SIMPLE: RIGHT UPPER ARM
LOCATION SIMPLE: CHEST
LOCATION SIMPLE: LEFT THIGH
LOCATION SIMPLE: UPPER BACK
LOCATION SIMPLE: RIGHT THIGH
LOCATION SIMPLE: LEFT UPPER ARM

## 2021-12-01 ASSESSMENT — LOCATION ZONE DERM
LOCATION ZONE: ARM
LOCATION ZONE: LEG
LOCATION ZONE: FEET
LOCATION ZONE: TRUNK
LOCATION ZONE: FACE

## 2021-12-01 NOTE — PROCEDURE: ADDITIONAL NOTES
Detail Level: Simple
Render Risk Assessment In Note?: no
Additional Notes: Will recheck benign appearing nevi on chest in 3 months.

## 2021-12-01 NOTE — PROCEDURE: MIPS QUALITY
Quality 431: Preventive Care And Screening: Unhealthy Alcohol Use - Screening: Patient not identified as an unhealthy alcohol user when screened for unhealthy alcohol use using a systematic screening method
Quality 226: Preventive Care And Screening: Tobacco Use: Screening And Cessation Intervention: Patient screened for tobacco use and is an ex/non-smoker
Detail Level: Detailed
Quality 402: Tobacco Use And Help With Quitting Among Adolescents: Patient screened for tobacco and never smoked
Quality 130: Documentation Of Current Medications In The Medical Record: Current Medications Documented

## 2021-12-14 ENCOUNTER — HOSPITAL ENCOUNTER (OUTPATIENT)
Facility: MEDICAL CENTER | Age: 31
End: 2021-12-14
Attending: OBSTETRICS & GYNECOLOGY
Payer: COMMERCIAL

## 2021-12-14 ENCOUNTER — GYNECOLOGY VISIT (OUTPATIENT)
Dept: OBGYN | Facility: CLINIC | Age: 31
End: 2021-12-14
Payer: COMMERCIAL

## 2021-12-14 VITALS — BODY MASS INDEX: 18.6 KG/M2 | WEIGHT: 105 LBS | DIASTOLIC BLOOD PRESSURE: 61 MMHG | SYSTOLIC BLOOD PRESSURE: 93 MMHG

## 2021-12-14 DIAGNOSIS — N97.9 PRIMARY FEMALE INFERTILITY: ICD-10-CM

## 2021-12-14 DIAGNOSIS — Z01.419 WELL WOMAN EXAM WITH ROUTINE GYNECOLOGICAL EXAM: ICD-10-CM

## 2021-12-14 DIAGNOSIS — Z12.4 SCREENING FOR CERVICAL CANCER: ICD-10-CM

## 2021-12-14 DIAGNOSIS — Z11.51 SCREENING FOR HUMAN PAPILLOMAVIRUS (HPV): ICD-10-CM

## 2021-12-14 PROCEDURE — 87624 HPV HI-RISK TYP POOLED RSLT: CPT

## 2021-12-14 PROCEDURE — 99395 PREV VISIT EST AGE 18-39: CPT | Performed by: OBSTETRICS & GYNECOLOGY

## 2021-12-14 PROCEDURE — 88175 CYTOPATH C/V AUTO FLUID REDO: CPT

## 2021-12-15 DIAGNOSIS — Z12.4 SCREENING FOR CERVICAL CANCER: ICD-10-CM

## 2021-12-15 DIAGNOSIS — Z11.51 SCREENING FOR HUMAN PAPILLOMAVIRUS (HPV): ICD-10-CM

## 2021-12-15 LAB
AMBIGUOUS DTTM AMBI4: NORMAL
CYTOLOGY REG CYTOL: NORMAL
HPV HR 12 DNA CVX QL NAA+PROBE: NEGATIVE
HPV16 DNA SPEC QL NAA+PROBE: NEGATIVE
HPV18 DNA SPEC QL NAA+PROBE: NEGATIVE
SPECIMEN SOURCE: NORMAL

## 2021-12-15 NOTE — PROGRESS NOTES
Eliel Chase is a 31 y.o. female who presents with Gynecologic Exam (annual)        CC: Annual exam    HPI: 31-year-old  0, last menstrual period 2021, not using contraception, presents for annual exam.  Menses every 28 days, lasting 4 to 6 days, with no intermenstrual bleeding.  Family history is unremarkable for breast cancer.  She denies history of abnormal Pap smears or sexually transmitted infections.  The patient has been trying to conceive for 4 years.      Past Medical History:   Diagnosis Date   • Raynaud's syndrome        History reviewed. No pertinent surgical history.      Current Outpatient Medications:   •  levonorgestrel-ethinyl estradiol (SRONYX) 0.1-20 MG-MCG per tablet, Take 1 Tab by mouth every day., Disp: 28 Tab, Rfl: 6  •  SRONYX 0.1-20 MG-MCG per tablet, TAKE 1 TAB BY MOUTH EVERY DAY., Disp: 28 Tab, Rfl: 2  •  fluticasone (FLONASE) 50 MCG/ACT nasal spray, Spray 2 sprays in each nostril daily PRN (Patient not taking: Reported on 2016), Disp: 1 Bottle, Rfl: 0    Psudatabs    Family History   Problem Relation Age of Onset   • Hyperlipidemia Mother    • Alcohol/Drug Father    • Psychiatric Illness Father    • Lung Disease Brother    • Stroke Maternal Aunt    • Alcohol/Drug Maternal Uncle    • Seizures Paternal Aunt    • Psychiatric Illness Brother    • Lung Disease Brother    • Alcohol/Drug Maternal Aunt        Social History     Socioeconomic History   • Marital status:      Spouse name: Not on file   • Number of children: Not on file   • Years of education: Not on file   • Highest education level: Not on file   Occupational History   • Not on file   Tobacco Use   • Smoking status: Never Smoker   • Smokeless tobacco: Never Used   Vaping Use   • Vaping Use: Never used   Substance and Sexual Activity   • Alcohol use: Yes     Alcohol/week: 0.0 oz   • Drug use: No   • Sexual activity: Yes     Partners: Male   Other Topics Concern   • Not on file   Social  History Narrative     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     Social Determinants of Health     Financial Resource Strain:    • Difficulty of Paying Living Expenses: Not on file   Food Insecurity:    • Worried About Running Out of Food in the Last Year: Not on file   • Ran Out of Food in the Last Year: Not on file   Transportation Needs:    • Lack of Transportation (Medical): Not on file   • Lack of Transportation (Non-Medical): Not on file   Physical Activity:    • Days of Exercise per Week: Not on file   • Minutes of Exercise per Session: Not on file   Stress:    • Feeling of Stress : Not on file   Social Connections:    • Frequency of Communication with Friends and Family: Not on file   • Frequency of Social Gatherings with Friends and Family: Not on file   • Attends Buddhist Services: Not on file   • Active Member of Clubs or Organizations: Not on file   • Attends Club or Organization Meetings: Not on file   • Marital Status: Not on file   Intimate Partner Violence:    • Fear of Current or Ex-Partner: Not on file   • Emotionally Abused: Not on file   • Physically Abused: Not on file   • Sexually Abused: Not on file   Housing Stability:    • Unable to Pay for Housing in the Last Year: Not on file   • Number of Places Lived in the Last Year: Not on file   • Unstable Housing in the Last Year: Not on file       OB History    Para Term  AB Living   0 0 0 0 0 0   SAB IAB Ectopic Molar Multiple Live Births   0 0 0 0 0 0       ROS - as per hpi           Objective     BP (!) 93/61   Wt 47.6 kg (105 lb)   LMP 2021   BMI 18.60 kg/m²      Physical Exam    GENERAL: Alert, in no apparent distress  PSYCHIATRIC: Appropriate affect, intact insight and judgement.  NECK:  Nontender, no masses.  No Thyromegaly or nodules. No lymphadenopathy.  RESPIRATORY: Normal respiratory effort.  Lungs clear to auscultation.   CARDIOVASCULAR: RRR, no murmur, gallop, or rub.  ABDOMEN: Soft, nontender,  nondistended.  No palpable masses.  No rebound or guarding.  No inguinal lymphadenopathy.  No hepatosplenomegaly.  No hernias.  BACK: No CVA tenderness  EXTREMITIES: No edema  SKIN: No rash    BREAST: No masses or tenderness.  No skin changes.  No nipple inversion or discharge. No axillary lymphadenopathy.      GENITOURINARY:  Normal external genitalia, no lesions.  Normal urethral meatus, no masses or tenderness.  Normal bladder without fullness or masses.  Vagina well estrogenized, no vaginal discharge or lesions.  Cervix without lesions or discharge, nontender.  Uterus normal size, shape, and contour, nontender.  Adnexa nontender, no masses.  Normal anus and perineum.    Rectal Exam - not indicated.                Assessment & Plan        1. Well woman exam with routine gynecological exam  Reviewed monthly self breast exams and need for yearly screening mammograms starting at age 40.  Discussed calcium intake, Vitamin D,  and weight bearing exercise for bone health.     2. Screening for cervical cancer  - THINPREP PAP WITH HPV; Future    3. Screening for human papillomavirus (HPV)  - THINPREP PAP WITH HPV; Future    4. Primary female infertility  Previous work-up normal, with normal semen analysis, HSG, labs.  Patient desires referral to infertility specialist.  - Referral to Infertility        F/U prn

## 2022-03-03 ENCOUNTER — APPOINTMENT (RX ONLY)
Dept: URBAN - METROPOLITAN AREA CLINIC 4 | Facility: CLINIC | Age: 32
Setting detail: DERMATOLOGY
End: 2022-03-03

## 2022-03-03 DIAGNOSIS — Z71.89 OTHER SPECIFIED COUNSELING: ICD-10-CM

## 2022-03-03 DIAGNOSIS — D22 MELANOCYTIC NEVI: ICD-10-CM | Status: STABLE

## 2022-03-03 PROBLEM — D22.5 MELANOCYTIC NEVI OF TRUNK: Status: ACTIVE | Noted: 2022-03-03

## 2022-03-03 PROCEDURE — ? ADDITIONAL NOTES

## 2022-03-03 PROCEDURE — 99212 OFFICE O/P EST SF 10 MIN: CPT

## 2022-03-03 PROCEDURE — ? PHOTO-DOCUMENTATION

## 2022-03-03 PROCEDURE — ? OBSERVATION AND MEASURE

## 2022-03-03 PROCEDURE — ? COUNSELING

## 2022-03-03 ASSESSMENT — LOCATION SIMPLE DESCRIPTION DERM: LOCATION SIMPLE: CHEST

## 2022-03-03 ASSESSMENT — LOCATION DETAILED DESCRIPTION DERM
LOCATION DETAILED: LEFT MEDIAL SUPERIOR CHEST
LOCATION DETAILED: RIGHT MEDIAL SUPERIOR CHEST
LOCATION DETAILED: UPPER STERNUM

## 2022-03-03 ASSESSMENT — LOCATION ZONE DERM: LOCATION ZONE: TRUNK

## 2022-03-03 NOTE — PROCEDURE: ADDITIONAL NOTES
Detail Level: Simple
Render Risk Assessment In Note?: no
Additional Notes: 3/3 Stable, not changing. Will continue monitoring at skin checks. \\n\\nPrior: Will recheck benign appearing nevi on chest in 3 months.

## 2022-10-18 ENCOUNTER — HOSPITAL ENCOUNTER (OUTPATIENT)
Dept: LAB | Facility: MEDICAL CENTER | Age: 32
End: 2022-10-18
Attending: OBSTETRICS & GYNECOLOGY
Payer: COMMERCIAL

## 2022-10-18 LAB
ABO GROUP BLD: NORMAL
PROLACTIN SERPL-MCNC: 7.54 NG/ML (ref 2.8–26)
RH BLD: NORMAL
RUBV AB SER QL: 78 IU/ML
TSH SERPL DL<=0.005 MIU/L-ACNC: 1.1 UIU/ML (ref 0.38–5.33)

## 2022-10-18 PROCEDURE — 36415 COLL VENOUS BLD VENIPUNCTURE: CPT

## 2022-10-18 PROCEDURE — 84146 ASSAY OF PROLACTIN: CPT

## 2022-10-18 PROCEDURE — 86901 BLOOD TYPING SEROLOGIC RH(D): CPT

## 2022-10-18 PROCEDURE — 83520 IMMUNOASSAY QUANT NOS NONAB: CPT

## 2022-10-18 PROCEDURE — 86762 RUBELLA ANTIBODY: CPT

## 2022-10-18 PROCEDURE — 86900 BLOOD TYPING SEROLOGIC ABO: CPT

## 2022-10-18 PROCEDURE — 84443 ASSAY THYROID STIM HORMONE: CPT

## 2022-10-21 LAB — MIS SERPL-MCNC: 0.12 NG/ML (ref 0.18–11.71)

## 2023-02-09 ENCOUNTER — HOSPITAL ENCOUNTER (OUTPATIENT)
Dept: LAB | Facility: MEDICAL CENTER | Age: 33
End: 2023-02-09
Attending: OBSTETRICS & GYNECOLOGY
Payer: COMMERCIAL

## 2023-02-09 PROCEDURE — 87591 N.GONORRHOEAE DNA AMP PROB: CPT

## 2023-02-09 PROCEDURE — 87340 HEPATITIS B SURFACE AG IA: CPT

## 2023-02-09 PROCEDURE — 87491 CHLMYD TRACH DNA AMP PROBE: CPT

## 2023-02-09 PROCEDURE — 86780 TREPONEMA PALLIDUM: CPT

## 2023-02-09 PROCEDURE — 87389 HIV-1 AG W/HIV-1&-2 AB AG IA: CPT

## 2023-02-09 PROCEDURE — 36415 COLL VENOUS BLD VENIPUNCTURE: CPT

## 2023-02-09 PROCEDURE — 86706 HEP B SURFACE ANTIBODY: CPT

## 2023-02-09 PROCEDURE — 86803 HEPATITIS C AB TEST: CPT

## 2023-02-10 LAB
HBV SURFACE AB SERPL IA-ACNC: <3.5 MIU/ML (ref 0–10)
HBV SURFACE AG SER QL: NORMAL
HCV AB SER QL: NORMAL
HIV 1+2 AB+HIV1 P24 AG SERPL QL IA: NORMAL
T PALLIDUM AB SER QL IA: NORMAL

## 2023-02-28 ENCOUNTER — HOSPITAL ENCOUNTER (OUTPATIENT)
Dept: LAB | Facility: MEDICAL CENTER | Age: 33
End: 2023-02-28
Attending: OBSTETRICS & GYNECOLOGY
Payer: COMMERCIAL

## 2023-02-28 LAB — B-HCG SERPL-ACNC: <1 MIU/ML (ref 0–5)

## 2023-02-28 PROCEDURE — 36415 COLL VENOUS BLD VENIPUNCTURE: CPT

## 2023-02-28 PROCEDURE — 84702 CHORIONIC GONADOTROPIN TEST: CPT

## 2023-03-31 ENCOUNTER — HOSPITAL ENCOUNTER (OUTPATIENT)
Dept: LAB | Facility: MEDICAL CENTER | Age: 33
End: 2023-03-31
Attending: OBSTETRICS & GYNECOLOGY
Payer: COMMERCIAL

## 2023-03-31 LAB — B-HCG SERPL-ACNC: <1 MIU/ML (ref 0–5)

## 2023-03-31 PROCEDURE — 36415 COLL VENOUS BLD VENIPUNCTURE: CPT

## 2023-03-31 PROCEDURE — 84702 CHORIONIC GONADOTROPIN TEST: CPT

## 2023-04-28 ENCOUNTER — HOSPITAL ENCOUNTER (OUTPATIENT)
Dept: LAB | Facility: MEDICAL CENTER | Age: 33
End: 2023-04-28
Attending: OBSTETRICS & GYNECOLOGY
Payer: COMMERCIAL

## 2023-04-28 LAB — B-HCG SERPL-ACNC: <1 MIU/ML (ref 0–5)

## 2023-04-28 PROCEDURE — 36415 COLL VENOUS BLD VENIPUNCTURE: CPT

## 2023-04-28 PROCEDURE — 84702 CHORIONIC GONADOTROPIN TEST: CPT

## 2024-05-08 ENCOUNTER — APPOINTMENT (OUTPATIENT)
Dept: OBGYN | Facility: CLINIC | Age: 34
End: 2024-05-08
Payer: COMMERCIAL

## 2024-05-28 ENCOUNTER — GYNECOLOGY VISIT (OUTPATIENT)
Dept: OBGYN | Facility: CLINIC | Age: 34
End: 2024-05-28
Payer: COMMERCIAL

## 2024-05-28 VITALS
SYSTOLIC BLOOD PRESSURE: 93 MMHG | WEIGHT: 99 LBS | HEIGHT: 63 IN | BODY MASS INDEX: 17.54 KG/M2 | DIASTOLIC BLOOD PRESSURE: 51 MMHG

## 2024-05-28 DIAGNOSIS — N92.6 MISSED MENSES: ICD-10-CM

## 2024-05-28 PROCEDURE — 99214 OFFICE O/P EST MOD 30 MIN: CPT | Performed by: OBSTETRICS & GYNECOLOGY

## 2024-05-28 PROCEDURE — 3078F DIAST BP <80 MM HG: CPT | Performed by: OBSTETRICS & GYNECOLOGY

## 2024-05-28 PROCEDURE — 3074F SYST BP LT 130 MM HG: CPT | Performed by: OBSTETRICS & GYNECOLOGY

## 2024-05-28 NOTE — PROGRESS NOTES
Patient here requesting labs   Last pap done/result: 12/15/2021  LMP: 4/26/2024  Phone number: [phone]  Pharmacy verified

## 2024-05-28 NOTE — PROGRESS NOTES
"GYN FOLLOW UP VISIT    CC:  Requesting Labs       HPI: Patient is a 33 y.o.  who presents for concerns of skipped menstrual cycle several months ago. She is concerned about early menopause. Otherwise her menses are regular each month, every 28 days, lasting 4-5 days. Heaviest first few days, then tapers.   No changes in weight, libido or hot flushes. She did report occasional hot flushes the month she skipped her menses. Pregnancy tests were also negative.   Underwent four cycles of ovulation induction and IUI last year with infertility specialists locally. Did not undergo IVF.   Does not recall when her mother underwent menopause.        ROS:   General: denies fever / chills  HEENT: denies sore throat:  CV: denies chest pain:  Repiratory: denies shortness of breath  GI: denies abdominal pain  : denies dysuria:    PFSH:  I personally reviewed the past medical and surgical histories.       PHYSICAL EXAMINATION:  Vital Signs:   Vitals:    24 0958   BP: 93/51   BP Location: Right arm   Patient Position: Sitting   BP Cuff Size: Adult   Weight: 99 lb   Height: 5' 3\"     Body mass index is 17.54 kg/m².    Gen: appears well, NAD  Exam deferred    ASSESSMENT AND PLAN:  33 y.o.  with skipped menses.   Patient concerned about premature menopause.   Will check FSH/LH and estradiol.   Advised to schedule gyn annual exam/pap.     RTC PRN.     1. Missed menses  FSH/LH    ESTRADIOL            Time spent: 15 minutes    Sapna Person M.D.  "

## 2024-09-10 SDOH — ECONOMIC STABILITY: FOOD INSECURITY: WITHIN THE PAST 12 MONTHS, YOU WORRIED THAT YOUR FOOD WOULD RUN OUT BEFORE YOU GOT MONEY TO BUY MORE.: NEVER TRUE

## 2024-09-10 SDOH — ECONOMIC STABILITY: FOOD INSECURITY: WITHIN THE PAST 12 MONTHS, THE FOOD YOU BOUGHT JUST DIDN'T LAST AND YOU DIDN'T HAVE MONEY TO GET MORE.: NEVER TRUE

## 2024-09-10 SDOH — HEALTH STABILITY: PHYSICAL HEALTH: ON AVERAGE, HOW MANY MINUTES DO YOU ENGAGE IN EXERCISE AT THIS LEVEL?: 20 MIN

## 2024-09-10 SDOH — HEALTH STABILITY: PHYSICAL HEALTH: ON AVERAGE, HOW MANY DAYS PER WEEK DO YOU ENGAGE IN MODERATE TO STRENUOUS EXERCISE (LIKE A BRISK WALK)?: 3 DAYS

## 2024-09-10 SDOH — ECONOMIC STABILITY: INCOME INSECURITY: IN THE LAST 12 MONTHS, WAS THERE A TIME WHEN YOU WERE NOT ABLE TO PAY THE MORTGAGE OR RENT ON TIME?: NO

## 2024-09-10 SDOH — ECONOMIC STABILITY: TRANSPORTATION INSECURITY
IN THE PAST 12 MONTHS, HAS THE LACK OF TRANSPORTATION KEPT YOU FROM MEDICAL APPOINTMENTS OR FROM GETTING MEDICATIONS?: NO

## 2024-09-10 SDOH — ECONOMIC STABILITY: TRANSPORTATION INSECURITY
IN THE PAST 12 MONTHS, HAS LACK OF RELIABLE TRANSPORTATION KEPT YOU FROM MEDICAL APPOINTMENTS, MEETINGS, WORK OR FROM GETTING THINGS NEEDED FOR DAILY LIVING?: NO

## 2024-09-10 SDOH — ECONOMIC STABILITY: TRANSPORTATION INSECURITY
IN THE PAST 12 MONTHS, HAS LACK OF TRANSPORTATION KEPT YOU FROM MEETINGS, WORK, OR FROM GETTING THINGS NEEDED FOR DAILY LIVING?: NO

## 2024-09-10 SDOH — ECONOMIC STABILITY: HOUSING INSECURITY
IN THE LAST 12 MONTHS, WAS THERE A TIME WHEN YOU DID NOT HAVE A STEADY PLACE TO SLEEP OR SLEPT IN A SHELTER (INCLUDING NOW)?: NO

## 2024-09-10 SDOH — ECONOMIC STABILITY: INCOME INSECURITY: HOW HARD IS IT FOR YOU TO PAY FOR THE VERY BASICS LIKE FOOD, HOUSING, MEDICAL CARE, AND HEATING?: NOT HARD AT ALL

## 2024-09-10 SDOH — HEALTH STABILITY: MENTAL HEALTH
STRESS IS WHEN SOMEONE FEELS TENSE, NERVOUS, ANXIOUS, OR CAN'T SLEEP AT NIGHT BECAUSE THEIR MIND IS TROUBLED. HOW STRESSED ARE YOU?: ONLY A LITTLE

## 2024-09-10 ASSESSMENT — LIFESTYLE VARIABLES
AUDIT-C TOTAL SCORE: 0
HOW OFTEN DO YOU HAVE SIX OR MORE DRINKS ON ONE OCCASION: NEVER
SKIP TO QUESTIONS 9-10: 1
HOW MANY STANDARD DRINKS CONTAINING ALCOHOL DO YOU HAVE ON A TYPICAL DAY: PATIENT DOES NOT DRINK
HOW OFTEN DO YOU HAVE A DRINK CONTAINING ALCOHOL: NEVER

## 2024-09-10 ASSESSMENT — SOCIAL DETERMINANTS OF HEALTH (SDOH)
HOW OFTEN DO YOU ATTEND CHURCH OR RELIGIOUS SERVICES?: MORE THAN 4 TIMES PER YEAR
WITHIN THE PAST 12 MONTHS, YOU WORRIED THAT YOUR FOOD WOULD RUN OUT BEFORE YOU GOT THE MONEY TO BUY MORE: NEVER TRUE
HOW OFTEN DO YOU HAVE A DRINK CONTAINING ALCOHOL: NEVER
HOW OFTEN DO YOU HAVE SIX OR MORE DRINKS ON ONE OCCASION: NEVER
HOW OFTEN DO YOU GET TOGETHER WITH FRIENDS OR RELATIVES?: THREE TIMES A WEEK
HOW OFTEN DO YOU ATTENT MEETINGS OF THE CLUB OR ORGANIZATION YOU BELONG TO?: MORE THAN 4 TIMES PER YEAR
HOW OFTEN DO YOU GET TOGETHER WITH FRIENDS OR RELATIVES?: THREE TIMES A WEEK
IN A TYPICAL WEEK, HOW MANY TIMES DO YOU TALK ON THE PHONE WITH FAMILY, FRIENDS, OR NEIGHBORS?: ONCE A WEEK
IN A TYPICAL WEEK, HOW MANY TIMES DO YOU TALK ON THE PHONE WITH FAMILY, FRIENDS, OR NEIGHBORS?: ONCE A WEEK
HOW OFTEN DO YOU ATTEND CHURCH OR RELIGIOUS SERVICES?: MORE THAN 4 TIMES PER YEAR
HOW MANY DRINKS CONTAINING ALCOHOL DO YOU HAVE ON A TYPICAL DAY WHEN YOU ARE DRINKING: PATIENT DOES NOT DRINK
IN THE PAST 12 MONTHS, HAS THE ELECTRIC, GAS, OIL, OR WATER COMPANY THREATENED TO SHUT OFF SERVICE IN YOUR HOME?: NO
DO YOU BELONG TO ANY CLUBS OR ORGANIZATIONS SUCH AS CHURCH GROUPS UNIONS, FRATERNAL OR ATHLETIC GROUPS, OR SCHOOL GROUPS?: YES
HOW HARD IS IT FOR YOU TO PAY FOR THE VERY BASICS LIKE FOOD, HOUSING, MEDICAL CARE, AND HEATING?: NOT HARD AT ALL
DO YOU BELONG TO ANY CLUBS OR ORGANIZATIONS SUCH AS CHURCH GROUPS UNIONS, FRATERNAL OR ATHLETIC GROUPS, OR SCHOOL GROUPS?: YES
HOW OFTEN DO YOU ATTENT MEETINGS OF THE CLUB OR ORGANIZATION YOU BELONG TO?: MORE THAN 4 TIMES PER YEAR

## 2024-09-11 ENCOUNTER — OFFICE VISIT (OUTPATIENT)
Dept: MEDICAL GROUP | Facility: IMAGING CENTER | Age: 34
End: 2024-09-11
Payer: COMMERCIAL

## 2024-09-11 VITALS
HEART RATE: 55 BPM | WEIGHT: 100.2 LBS | SYSTOLIC BLOOD PRESSURE: 106 MMHG | RESPIRATION RATE: 16 BRPM | BODY MASS INDEX: 17.75 KG/M2 | DIASTOLIC BLOOD PRESSURE: 60 MMHG | TEMPERATURE: 98.4 F | HEIGHT: 63 IN | OXYGEN SATURATION: 97 %

## 2024-09-11 DIAGNOSIS — M67.431 GANGLION CYST OF DORSUM OF RIGHT WRIST: ICD-10-CM

## 2024-09-11 PROCEDURE — 99203 OFFICE O/P NEW LOW 30 MIN: CPT | Performed by: STUDENT IN AN ORGANIZED HEALTH CARE EDUCATION/TRAINING PROGRAM

## 2024-09-11 NOTE — PATIENT INSTRUCTIONS
Thank you for choosing [facility]. It was a pleasure meeting you today.     Take care!  [facility], [place]

## 2024-09-11 NOTE — PROGRESS NOTES
Subjective:       CC:   Chief Complaint   Patient presents with    Cyst     Possible ganglion cyst on right wrist, noticed 7/21/24       HPI:         Jahaira is a pleasant 34 y.o. female who is new to me and is here to discuss the following:    Ganlion cyst: located on R hand dorsally. She first noticed it a few months ago after going rafting. Cyst has been growing. It is not painful. She would like the cyst removed.     Otherwise, reports doing well and doesn't have other concerns today. She does not have a current PCP. She does not want to establish care today. She will find a new PCP when she is ready to establish care.     ROS:   See HPI    Patient Active Problem List    Diagnosis Date Noted    Hypovitaminosis D 11/06/2018    Malnutrition (HCC) 11/06/2018    Health care maintenance 11/06/2018    Family planning 07/16/2015       Past Medical History:   Diagnosis Date    Raynaud's syndrome        No current outpatient medications on file.     No current facility-administered medications for this visit.       Allergies as of 09/11/2024 - Reviewed 09/11/2024   Allergen Reaction Noted    Psudatabs Unspecified 07/16/2015       Social History     Socioeconomic History    Marital status:      Spouse name: Not on file    Number of children: Not on file    Years of education: Not on file    Highest education level: Master's degree (e.g., MA, MS, Olga Lidia, MEd, MSW, MESERET)   Occupational History    Not on file   Tobacco Use    Smoking status: Never    Smokeless tobacco: Never   Vaping Use    Vaping status: Never Used   Substance and Sexual Activity    Alcohol use: Not Currently    Drug use: Never    Sexual activity: Yes     Partners: Male     Birth control/protection: None   Other Topics Concern    Not on file   Social History Narrative     at [employer] [facility]. Discharge planning     Social Determinants of Health     Financial Resource Strain: Low Risk  (9/10/2024)    Overall Financial Resource Strain  (CARDIA)     Difficulty of Paying Living Expenses: Not hard at all   Food Insecurity: No Food Insecurity (9/10/2024)    Hunger Vital Sign     Worried About Running Out of Food in the Last Year: Never true     Ran Out of Food in the Last Year: Never true   Transportation Needs: No Transportation Needs (9/10/2024)    PRAPARE - Transportation     Lack of Transportation (Medical): No     Lack of Transportation (Non-Medical): No   Physical Activity: Insufficiently Active (9/10/2024)    Exercise Vital Sign     Days of Exercise per Week: 3 days     Minutes of Exercise per Session: 20 min   Stress: No Stress Concern Present (9/10/2024)    Citizen of [place] of Occupational Health - Occupational Stress Questionnaire     Feeling of Stress : Only a little   Social Connections: Socially Integrated (9/10/2024)    Social Connection and Isolation Panel [NHANES]     Frequency of Communication with Friends and Family: Once a week     Frequency of Social Gatherings with Friends and Family: Three times a week     Attends Anglican Services: More than 4 times per year     Active Member of Clubs or Organizations: Yes     Attends Club or Organization Meetings: More than 4 times per year     Marital Status:    Intimate Partner Violence: Not on file   Housing Stability: Low Risk  (9/10/2024)    Housing Stability Vital Sign     Unable to Pay for Housing in the Last Year: No     Number of Times Moved in the Last Year: 1     Homeless in the Last Year: No       Family History   Problem Relation Age of Onset    Hyperlipidemia Mother     Alcohol/Drug Father     Psychiatric Illness Father     Lung Disease Brother     Psychiatric Illness Brother     Autism Brother     Stroke Maternal Aunt     Arterial Aneurysm Maternal Aunt     Alcohol/Drug Maternal Uncle     Seizures Paternal Aunt     Psychiatric Illness Brother     Lung Disease Brother     Alcohol/Drug Maternal Aunt        History reviewed. No pertinent surgical history.        Objective:  "    /60 (BP Location: Left arm, Patient Position: Sitting, BP Cuff Size: Adult)   Pulse (!) 55   Temp 36.9 °C (98.4 °F) (Temporal)   Resp 16   Ht 1.6 m (5' 3\")   Wt 45.5 kg (100 lb 3.2 oz)   LMP 08/25/2024 (Approximate)   SpO2 97%   BMI 17.75 kg/m²     Physical Exam  Constitutional:       General: She is not in acute distress.     Appearance: Normal appearance.   HENT:      Head: Normocephalic and atraumatic.   Eyes:      General: No scleral icterus.        Right eye: No discharge.         Left eye: No discharge.   Neck:      Thyroid: No thyroid mass or thyromegaly.      Vascular: No carotid bruit.   Cardiovascular:      Rate and Rhythm: Normal rate and regular rhythm.      Heart sounds: Normal heart sounds. No murmur heard.  Pulmonary:      Effort: Pulmonary effort is normal.      Breath sounds: Normal breath sounds. No wheezing.   Abdominal:      General: Bowel sounds are normal. There is no distension.      Palpations: Abdomen is soft. There is no mass.      Tenderness: There is no abdominal tenderness.   Musculoskeletal:         General: Normal range of motion.      Cervical back: Normal range of motion and neck supple.      Comments: Fluid-filled lump over dorsum of R wrist. Normal ROM.    Lymphadenopathy:      Cervical: No cervical adenopathy.   Skin:     General: Skin is warm and dry.   Neurological:      General: No focal deficit present.      Mental Status: She is alert and oriented to person, place, and time.      Gait: Gait normal.   Psychiatric:         Mood and Affect: Mood normal.         Behavior: Behavior normal.         Thought Content: Thought content normal.         Judgment: Judgment normal.            Assessment and Plan:       1. Ganglion cyst of dorsum of right wrist  Acute.  Referred to Ortho for management.  - Referral to Orthopedics         No follow-ups on file.     Please note that this dictation was created using voice recognition software. I have made every reasonable " attempt to correct obvious errors, but I expect that there are errors of grammar and possibly content that I did not discover before finalizing the note.    Tiffany Abdalla PA-C  [facility]

## 2024-10-04 PROBLEM — M67.431 GANGLION CYST OF DORSUM OF RIGHT WRIST: Status: ACTIVE | Noted: 2024-10-04

## 2024-10-04 PROBLEM — M25.531 RIGHT WRIST PAIN: Status: ACTIVE | Noted: 2024-10-04

## 2024-10-22 ENCOUNTER — APPOINTMENT (OUTPATIENT)
Dept: MEDICAL GROUP | Facility: MEDICAL CENTER | Age: 34
End: 2024-10-22
Payer: COMMERCIAL

## 2025-08-08 ENCOUNTER — APPOINTMENT (OUTPATIENT)
Dept: MEDICAL GROUP | Facility: IMAGING CENTER | Age: 35
End: 2025-08-08
Payer: COMMERCIAL

## 2025-08-08 PROBLEM — I73.00 RAYNAUD'S PHENOMENON WITHOUT GANGRENE: Status: ACTIVE | Noted: 2025-08-08

## 2025-08-08 PROBLEM — M67.431 GANGLION CYST OF DORSUM OF RIGHT WRIST: Status: RESOLVED | Noted: 2024-10-04 | Resolved: 2025-08-08

## 2025-08-08 PROBLEM — R63.6 UNDERWEIGHT (BMI < 18.5): Status: ACTIVE | Noted: 2025-08-08

## 2025-08-08 PROBLEM — Z00.00 HEALTH CARE MAINTENANCE: Status: RESOLVED | Noted: 2018-11-06 | Resolved: 2025-08-08

## 2025-08-08 ASSESSMENT — ENCOUNTER SYMPTOMS
DIARRHEA: 0
NERVOUS/ANXIOUS: 0
SHORTNESS OF BREATH: 0
DIZZINESS: 0
DEPRESSION: 0
CHILLS: 0
BLURRED VISION: 0
FEVER: 0
PALPITATIONS: 0
CONSTIPATION: 0
ABDOMINAL PAIN: 0
SEIZURES: 0
BLOOD IN STOOL: 0

## 2025-08-08 ASSESSMENT — PATIENT HEALTH QUESTIONNAIRE - PHQ9: CLINICAL INTERPRETATION OF PHQ2 SCORE: 0

## 2025-08-15 ENCOUNTER — OFFICE VISIT (OUTPATIENT)
Dept: MEDICAL GROUP | Facility: IMAGING CENTER | Age: 35
End: 2025-08-15
Payer: COMMERCIAL

## 2025-08-15 VITALS
HEIGHT: 63 IN | BODY MASS INDEX: 17.7 KG/M2 | HEART RATE: 52 BPM | RESPIRATION RATE: 16 BRPM | OXYGEN SATURATION: 100 % | SYSTOLIC BLOOD PRESSURE: 102 MMHG | DIASTOLIC BLOOD PRESSURE: 68 MMHG | TEMPERATURE: 97.2 F | WEIGHT: 99.87 LBS

## 2025-08-15 DIAGNOSIS — R53.83 FATIGUE, UNSPECIFIED TYPE: Primary | ICD-10-CM

## 2025-08-15 DIAGNOSIS — I73.00 RAYNAUD'S PHENOMENON WITHOUT GANGRENE: ICD-10-CM

## 2025-08-15 PROCEDURE — 3078F DIAST BP <80 MM HG: CPT | Performed by: STUDENT IN AN ORGANIZED HEALTH CARE EDUCATION/TRAINING PROGRAM

## 2025-08-15 PROCEDURE — 3074F SYST BP LT 130 MM HG: CPT | Performed by: STUDENT IN AN ORGANIZED HEALTH CARE EDUCATION/TRAINING PROGRAM

## 2025-08-15 PROCEDURE — 1126F AMNT PAIN NOTED NONE PRSNT: CPT | Performed by: STUDENT IN AN ORGANIZED HEALTH CARE EDUCATION/TRAINING PROGRAM

## 2025-08-15 PROCEDURE — 99213 OFFICE O/P EST LOW 20 MIN: CPT | Performed by: STUDENT IN AN ORGANIZED HEALTH CARE EDUCATION/TRAINING PROGRAM

## 2025-08-15 ASSESSMENT — PAIN SCALES - GENERAL: PAINLEVEL_OUTOF10: NO PAIN

## 2025-08-15 ASSESSMENT — LIFESTYLE VARIABLES
HOW OFTEN DO YOU HAVE A DRINK CONTAINING ALCOHOL: NEVER
HOW OFTEN DO YOU HAVE SIX OR MORE DRINKS ON ONE OCCASION: NEVER
HOW MANY STANDARD DRINKS CONTAINING ALCOHOL DO YOU HAVE ON A TYPICAL DAY: PATIENT DOES NOT DRINK
SKIP TO QUESTIONS 9-10: 1
AUDIT-C TOTAL SCORE: 0